# Patient Record
Sex: MALE | Race: WHITE | Employment: OTHER | URBAN - METROPOLITAN AREA
[De-identification: names, ages, dates, MRNs, and addresses within clinical notes are randomized per-mention and may not be internally consistent; named-entity substitution may affect disease eponyms.]

---

## 2022-06-22 RX ORDER — CLONAZEPAM 1 MG/1
TABLET ORAL
COMMUNITY

## 2022-06-22 RX ORDER — PROCHLORPERAZINE MALEATE 10 MG
TABLET ORAL
COMMUNITY

## 2022-06-22 RX ORDER — ZOLPIDEM TARTRATE 10 MG/1
TABLET ORAL
COMMUNITY

## 2022-06-22 RX ORDER — SIMVASTATIN 40 MG
TABLET ORAL
COMMUNITY

## 2022-06-22 RX ORDER — PSEUDOEPHEDRINE HCL 30 MG
TABLET ORAL
COMMUNITY

## 2022-06-22 RX ORDER — TRAZODONE HYDROCHLORIDE 100 MG/1
TABLET ORAL
COMMUNITY

## 2022-06-22 RX ORDER — SENNA PLUS 8.6 MG/1
TABLET ORAL
COMMUNITY

## 2022-06-22 RX ORDER — OXCARBAZEPINE 600 MG/1
TABLET, FILM COATED ORAL
COMMUNITY

## 2022-06-22 RX ORDER — MECLIZINE HYDROCHLORIDE CHEWABLE TABLETS 25 MG/1
TABLET, CHEWABLE ORAL
COMMUNITY

## 2022-06-22 RX ORDER — TADALAFIL 5 MG/1
TABLET ORAL
COMMUNITY

## 2022-06-22 RX ORDER — PREGABALIN 150 MG/1
1 CAPSULE ORAL
COMMUNITY

## 2022-06-22 RX ORDER — PRAZOSIN HYDROCHLORIDE 2 MG/1
1 CAPSULE ORAL
COMMUNITY

## 2022-06-22 RX ORDER — TEMAZEPAM 30 MG/1
CAPSULE ORAL
COMMUNITY

## 2022-06-22 RX ORDER — TAMSULOSIN HYDROCHLORIDE 0.4 MG/1
CAPSULE ORAL
COMMUNITY

## 2022-06-22 RX ORDER — IPRATROPIUM BROMIDE 21 UG/1
SPRAY, METERED NASAL
COMMUNITY

## 2022-06-22 RX ORDER — CHLORHEXIDINE GLUCONATE 0.12 MG/ML
RINSE ORAL
COMMUNITY
Start: 2014-04-16

## 2022-06-22 RX ORDER — NAPROXEN 500 MG/1
TABLET ORAL
COMMUNITY

## 2022-06-22 RX ORDER — PROPRANOLOL HYDROCHLORIDE 10 MG/1
TABLET ORAL
COMMUNITY

## 2022-06-22 RX ORDER — MAGNESIUM HYDROXIDE 1200 MG/15ML
LIQUID ORAL
COMMUNITY

## 2022-06-22 RX ORDER — METHOCARBAMOL 500 MG/1
TABLET, FILM COATED ORAL
COMMUNITY

## 2022-06-22 RX ORDER — DULOXETIN HYDROCHLORIDE 60 MG/1
1 CAPSULE, DELAYED RELEASE ORAL
COMMUNITY

## 2022-06-22 RX ORDER — OMEPRAZOLE 40 MG/1
1 CAPSULE, DELAYED RELEASE ORAL
COMMUNITY

## 2022-06-22 RX ORDER — CITALOPRAM 40 MG/1
TABLET ORAL
COMMUNITY

## 2022-06-22 RX ORDER — OXYCODONE HYDROCHLORIDE AND ACETAMINOPHEN 5; 325 MG/1; MG/1
TABLET ORAL
COMMUNITY
Start: 2014-04-01

## 2022-06-22 RX ORDER — ARIPIPRAZOLE 30 MG/1
TABLET ORAL
COMMUNITY

## 2022-06-22 RX ORDER — OXYBUTYNIN CHLORIDE 15 MG/1
TABLET, EXTENDED RELEASE ORAL
COMMUNITY

## 2022-06-22 RX ORDER — TRIAMCINOLONE ACETONIDE 1 MG/G
CREAM TOPICAL
COMMUNITY

## 2022-06-22 RX ORDER — AMLODIPINE BESYLATE 5 MG/1
TABLET ORAL
COMMUNITY

## 2022-11-15 PROBLEM — F43.10 POSTTRAUMATIC STRESS DISORDER: Status: ACTIVE | Noted: 2022-11-15

## 2022-11-15 PROBLEM — R41.3 AMNESIA: Status: ACTIVE | Noted: 2022-11-15

## 2022-11-15 PROBLEM — R11.2 POSTOPERATIVE NAUSEA AND VOMITING: Status: ACTIVE | Noted: 2022-11-15

## 2022-11-15 PROBLEM — M53.3 SACROILIAC DYSFUNCTION: Status: ACTIVE | Noted: 2022-11-15

## 2022-11-15 PROBLEM — M51.26 RUPTURED LUMBAR DISC: Status: ACTIVE | Noted: 2022-11-15

## 2022-11-15 PROBLEM — M53.3 DISORDER OF SACRUM: Status: ACTIVE | Noted: 2022-11-15

## 2022-11-15 PROBLEM — M50.30 DEGENERATION OF INTERVERTEBRAL DISC OF CERVICAL REGION: Status: ACTIVE | Noted: 2022-11-15

## 2022-11-15 PROBLEM — T14.90XA INJURY: Status: ACTIVE | Noted: 2022-11-15

## 2022-11-15 PROBLEM — I10 HYPERTENSION: Status: ACTIVE | Noted: 2022-02-23

## 2022-11-15 PROBLEM — D64.9 ANEMIA: Status: ACTIVE | Noted: 2022-11-15

## 2022-11-15 PROBLEM — M18.11 LOCALIZED PRIMARY OSTEOARTHRITIS OF CARPOMETACARPAL JOINT OF RIGHT THUMB: Status: ACTIVE | Noted: 2022-02-23

## 2022-11-15 PROBLEM — Z98.890 POSTOPERATIVE NAUSEA AND VOMITING: Status: ACTIVE | Noted: 2022-11-15

## 2022-11-15 PROBLEM — M19.011 PRIMARY OSTEOARTHRITIS OF RIGHT SHOULDER: Status: ACTIVE | Noted: 2022-11-15

## 2022-11-15 PROBLEM — Z20.822 SUSPECTED SEVERE ACUTE RESPIRATORY SYNDROME CORONAVIRUS 2 (SARS-COV-2) INFECTION: Status: ACTIVE | Noted: 2022-02-10

## 2022-11-15 PROBLEM — M54.12 CERVICAL RADICULOPATHY: Status: ACTIVE | Noted: 2022-11-15

## 2022-11-15 PROBLEM — R01.1 HEART MURMUR: Status: ACTIVE | Noted: 2022-11-15

## 2022-11-15 PROBLEM — N39.3 STRESS INCONTINENCE OF URINE: Status: ACTIVE | Noted: 2022-11-15

## 2022-11-15 PROBLEM — F41.9 ANXIETY: Status: ACTIVE | Noted: 2022-11-15

## 2022-11-15 PROBLEM — M25.372 LEFT ANKLE INSTABILITY: Status: ACTIVE | Noted: 2022-11-15

## 2022-11-15 PROBLEM — M25.872 IMPINGEMENT SYNDROME OF LEFT ANKLE: Status: ACTIVE | Noted: 2022-11-15

## 2022-11-15 PROBLEM — M25.512 PAIN IN JOINT OF LEFT SHOULDER: Status: ACTIVE | Noted: 2022-02-23

## 2022-11-15 PROBLEM — M24.111 DEGENERATIVE TEAR OF GLENOID LABRUM OF RIGHT SHOULDER: Status: ACTIVE | Noted: 2022-11-15

## 2022-11-15 PROBLEM — K21.9 GASTROESOPHAGEAL REFLUX DISEASE: Status: ACTIVE | Noted: 2022-02-23

## 2022-11-15 PROBLEM — E78.5 HYPERLIPIDEMIA: Status: ACTIVE | Noted: 2022-02-23

## 2022-11-15 PROBLEM — M25.519 SHOULDER PAIN: Status: ACTIVE | Noted: 2022-11-15

## 2022-11-15 PROBLEM — I48.91 ATRIAL FIBRILLATION (HCC): Status: ACTIVE | Noted: 2022-08-25

## 2022-11-15 PROBLEM — F32.A DEPRESSIVE DISORDER: Status: ACTIVE | Noted: 2022-11-15

## 2022-12-22 PROBLEM — N52.9 ERECTILE DYSFUNCTION: Status: ACTIVE | Noted: 2021-10-19

## 2022-12-22 PROBLEM — M20.41 HAMMER TOE OF RIGHT FOOT: Status: ACTIVE | Noted: 2018-06-05

## 2023-02-08 PROBLEM — M19.011 LOCALIZED PRIMARY OSTEOARTHRITIS OF RIGHT SHOULDER REGION: Status: ACTIVE | Noted: 2023-02-08

## 2023-08-07 PROBLEM — I44.0 1ST DEGREE AV BLOCK: Status: ACTIVE | Noted: 2023-06-12

## 2023-08-07 PROBLEM — I11.9 HYPERTENSIVE HEART DISEASE WITHOUT HEART FAILURE: Status: ACTIVE | Noted: 2022-05-17

## 2023-08-09 PROBLEM — M19.032 OSTEOARTHRITIS OF LEFT WRIST: Status: ACTIVE | Noted: 2023-08-09

## 2023-08-09 PROBLEM — M65.9 SYNOVITIS OF HAND: Status: ACTIVE | Noted: 2023-08-09

## 2023-08-22 PROBLEM — Z96.611 STATUS POST TOTAL REPLACEMENT OF RIGHT SHOULDER: Status: ACTIVE | Noted: 2023-08-22

## 2023-08-22 PROBLEM — Z47.89 ORTHOPEDIC AFTERCARE: Status: ACTIVE | Noted: 2023-08-22

## 2023-08-22 PROBLEM — S43.421A SPRAIN OF RIGHT ROTATOR CUFF CAPSULE: Status: ACTIVE | Noted: 2023-08-22

## 2023-10-10 PROBLEM — Z96.611 HISTORY OF ARTHROPLASTY OF RIGHT SHOULDER: Status: ACTIVE | Noted: 2023-10-10

## 2023-11-23 ENCOUNTER — APPOINTMENT (OUTPATIENT)
Dept: CT IMAGING | Age: 73
End: 2023-11-23
Payer: MEDICARE

## 2023-11-23 ENCOUNTER — HOSPITAL ENCOUNTER (EMERGENCY)
Age: 73
Discharge: HOME | End: 2023-11-23
Attending: EMERGENCY MEDICINE
Payer: MEDICARE

## 2023-11-23 VITALS
DIASTOLIC BLOOD PRESSURE: 84 MMHG | TEMPERATURE: 97.8 F | HEIGHT: 72 IN | WEIGHT: 210 LBS | BODY MASS INDEX: 28.44 KG/M2 | HEART RATE: 52 BPM | OXYGEN SATURATION: 95 % | RESPIRATION RATE: 17 BRPM | SYSTOLIC BLOOD PRESSURE: 145 MMHG

## 2023-11-23 DIAGNOSIS — G44.319 ACUTE POST-TRAUMATIC HEADACHE, NOT INTRACTABLE: ICD-10-CM

## 2023-11-23 DIAGNOSIS — H53.2 DIPLOPIA: Primary | ICD-10-CM

## 2023-11-23 PROCEDURE — 70450 CT HEAD/BRAIN W/O DYE: CPT

## 2023-11-23 PROCEDURE — 99284 EMERGENCY DEPT VISIT MOD MDM: CPT

## 2023-11-23 PROCEDURE — 72125 CT NECK SPINE W/O DYE: CPT

## 2023-11-23 ASSESSMENT — ENCOUNTER SYMPTOMS
NAUSEA: 0
BACK PAIN: 1
VOMITING: 0
FACIAL SWELLING: 0

## 2023-11-23 ASSESSMENT — PAIN - FUNCTIONAL ASSESSMENT: PAIN_FUNCTIONAL_ASSESSMENT: 0-10

## 2023-11-23 ASSESSMENT — PAIN SCALES - GENERAL: PAINLEVEL_OUTOF10: 2

## 2023-11-23 NOTE — DISCHARGE INSTRUCTIONS
Follow-up with your family doctor next week if double vision not improving they may need to get an MRI arranged on outpatient basis    Tylenol for any headaches

## 2023-11-23 NOTE — ED NOTES
Pt presents to the ED for c/o double vision and a headache after a fall and head injury 9 days ago.   No other c/o's at the present time     Ana Maria Mcclure  11/23/23 [id number]

## 2023-11-23 NOTE — ED PROVIDER NOTES
facial swelling and nosebleeds. Eyes:  Positive for visual disturbance. Gastrointestinal:  Negative for nausea and vomiting. Musculoskeletal:  Positive for back pain and neck pain. Neurological:  Positive for headaches. All other systems reviewed and are negative. Physical Exam     Vitals signs and nursing note reviewed:  Vitals:    11/23/23 1030 11/23/23 1031 11/23/23 1033 11/23/23 1043   BP: 136/79      Pulse:       Resp:   17    Temp:       TempSrc:       SpO2:  94%  97%   Weight:       Height:          Physical Exam  Vitals and nursing note reviewed. Constitutional:       General: He is not in acute distress. Appearance: Normal appearance. He is not ill-appearing. HENT:      Head: Normocephalic and atraumatic. Right Ear: External ear normal. No hemotympanum. Left Ear: External ear normal. No hemotympanum. Nose: Nose normal. No rhinorrhea. Eyes:      General: No scleral icterus. Right eye: No discharge. Left eye: No discharge. Extraocular Movements: Extraocular movements intact. Right eye: Normal extraocular motion and no nystagmus. Left eye: Normal extraocular motion and no nystagmus. Conjunctiva/sclera: Conjunctivae normal.      Pupils: Pupils are equal, round, and reactive to light. Pulmonary:      Effort: Pulmonary effort is normal. No respiratory distress. Chest:      Chest wall: No tenderness. Abdominal:      General: There is no distension. Tenderness: There is no abdominal tenderness. There is no guarding or rebound. Musculoskeletal:         General: No signs of injury. Normal range of motion. Cervical back: Normal range of motion and neck supple. No tenderness. Skin:     General: Skin is warm and dry. Coloration: Skin is not jaundiced or pale. Neurological:      General: No focal deficit present. Mental Status: He is alert and oriented to person, place, and time. Mental status is at baseline.

## 2023-11-23 NOTE — ED TRIAGE NOTES
Pt reports that he tripped and fell 9 days ago and hit his head on the concrete, pt reports continued double vision since and needs to drive to [address] today. Pt reports is on thinners but was not seen post hitting his head.
Male